# Patient Record
Sex: FEMALE | URBAN - METROPOLITAN AREA
[De-identification: names, ages, dates, MRNs, and addresses within clinical notes are randomized per-mention and may not be internally consistent; named-entity substitution may affect disease eponyms.]

---

## 2018-01-18 ENCOUNTER — OFFICE VISIT (OUTPATIENT)
Dept: URGENT CARE | Facility: URGENT CARE | Age: 43
End: 2018-01-18

## 2018-01-18 VITALS
WEIGHT: 140 LBS | DIASTOLIC BLOOD PRESSURE: 80 MMHG | HEIGHT: 57 IN | BODY MASS INDEX: 30.2 KG/M2 | HEART RATE: 80 BPM | TEMPERATURE: 98 F | SYSTOLIC BLOOD PRESSURE: 120 MMHG

## 2018-01-18 DIAGNOSIS — K08.89 PAIN, DENTAL: Primary | ICD-10-CM

## 2018-01-18 PROCEDURE — 99203 OFFICE O/P NEW LOW 30 MIN: CPT | Performed by: EMERGENCY MEDICINE

## 2018-01-18 RX ORDER — AMOXICILLIN 875 MG
875 TABLET ORAL 2 TIMES DAILY
Qty: 20 TABLET | Refills: 0 | Status: SHIPPED | OUTPATIENT
Start: 2018-01-18 | End: 2018-01-28

## 2018-01-18 ASSESSMENT — ENCOUNTER SYMPTOMS
DIAPHORESIS: 0
FEVER: 0

## 2018-01-18 NOTE — MR AVS SNAPSHOT
"              After Visit Summary   1/18/2018    Nati Santana    MRN: [id number]           Patient Information     Date Of Birth          1975        Visit Information        Provider Department      1/18/2018 6:10 PM Danielito Coelol MD [facility]        Today's Diagnoses     Pain, dental    -  1      Care Instructions       *DENTAL PAIN    A crack or cavity in the tooth, which exposes the sensitive inner area of the tooth can cause tooth pain. An infection in the gum or the root of the tooth can cause pain and swelling. The pain is often made worse by drinking hot or cold fluids, or biting on hard foods. Pain may spread from the tooth to the ear or jaw on the same side.  HOME CARE:  1. Avoid hot and cold foods and liquids since your tooth may be sensitive to temperature changes.  2. If your tooth is chipped or cracked, or if there is a large open cavity, apply OIL OF CLOVES (available over-the-counter in drug stores) directly to the tooth to reduce pain. Some pharmacies carry an over-the-counter \"toothache kit.\" This contains a paste, which can be applied over the exposed tooth to decrease sensitivity. This is only a temporary solution. See a dentist as soon as possible to fix the tooth.  3. A cold pack on your jaw over the sore area may help reduce pain.  4. You may use acetaminophen (Tylenol) 650-1000 mg every 6 hours or ibuprofen (Motrin, Advil) 600 mg every 6-8 hours with food to control pain, if you are able to take these medicines. [ NOTE: If you have chronic liver or kidney disease or ever had a stomach ulcer or GI bleeding, talk with your doctor before using these medicines.]  5. If you have signs of an infection, an antibiotic will be given. Take it as directed.  FOLLOW-UP as directed with a dentist. Your pain may go away with the treatment given. However, only a dentist can fully evaluate and treat the cause and prevent the pain from coming back again.  TOOTHACHE IS A " "SIGN OF DISEASE IN YOUR TOOTH AND SHOULD BE EXAMINED AND TREATED BY A DENTIST.  GET PROMPT MEDICAL ATTENTION if any of the following occur:    Your face becomes swollen or red    Pain worsens or spreads to the neck    Fever over 101  F (38.3  C)    Unusual drowsiness; headache or stiff neck; weakness or fainting    Pus drains from the tooth    Difficulty swallowing or breathing       0167-9458 [employer]. [address] [place] [address]. All rights reserved. This information is not intended as a substitute for professional medical care. Always follow your healthcare professional's instructions.  This information has been modified by your health care provider with permission from the publisher.            Follow-ups after your visit        Follow-up notes from your care team     Return if symptoms worsen or fail to improve.      Who to contact     If you have questions or need follow up information about today's clinic visit or your schedule please contact [facility] directly at [phone].  Normal or non-critical lab and imaging results will be communicated to you by Enevohart, letter or phone within 4 business days after the clinic has received the results. If you do not hear from us within 7 days, please contact the clinic through Enevohart or phone. If you have a critical or abnormal lab result, we will notify you by phone as soon as possible.  Submit refill requests through Stayzilla or call your pharmacy and they will forward the refill request to us. Please allow 3 business days for your refill to be completed.          Additional Information About Your Visit        Enevohart Information     Stayzilla lets you send messages to your doctor, view your test results, renew your prescriptions, schedule appointments and more. To sign up, go to [email or web address] . Click on \"Log in\" on the left side of the screen, which will take you to the Welcome page. Then click on " "\"Sign up Now\" on the right side of the page.     You will be asked to enter the access code listed below, as well as some personal information. Please follow the directions to create your username and password.     Your access code is: [email or web address]  Expires: 2018  6:29 PM     Your access code will  in 90 days. If you need help or a new code, please call your [facility] clinic or [phone].        Care EveryWhere ID     This is your Care EveryWhere ID. This could be used by other organizations to access your [facility] medical records  [id number]        Your Vitals Were     Pulse Temperature Height BMI (Body Mass Index)          80 98  F (36.7  C) 4' 9\" (1.448 m) 30.3 kg/m2         Blood Pressure from Last 3 Encounters:   18 120/80    Weight from Last 3 Encounters:   18 140 lb (63.5 kg)              Today, you had the following     No orders found for display         Today's Medication Changes          These changes are accurate as of: 18  6:31 PM.  If you have any questions, ask your nurse or doctor.               Start taking these medicines.        Dose/Directions    amoxicillin 875 MG tablet   Commonly known as:  AMOXIL   Used for:  Pain, dental        Dose:  875 mg   Take 1 tablet (875 mg) by mouth 2 times daily for 10 days   Quantity:  20 tablet   Refills:  0            Where to get your medicines      Some of these will need a paper prescription and others can be bought over the counter.  Ask your nurse if you have questions.     Bring a paper prescription for each of these medications     amoxicillin 875 MG tablet                Primary Care Provider    Provider Outside       No address on file        Equal Access to Services     [facility]: Hadii mickie Harkins, benjy sotelo, qaybta hernandez you. So [facility] [phone].    ATENCIÓN: Si habla español, tiene a dean disposición servicios gratuitos de asistencia lingüística. " Jennifer de la torre [phone].    We comply with applicable federal civil rights laws and [place] laws. We do not discriminate on the basis of race, color, national origin, age, disability, sex, sexual orientation, or gender identity.            Thank you!     Thank you for choosing [facility]  for your care. Our goal is always to provide you with excellent care. Hearing back from our patients is one way we can continue to improve our services. Please take a few minutes to complete the written survey that you may receive in the mail after your visit with us. Thank you!             Your Updated Medication List - Protect others around you: Learn how to safely use, store and throw away your medicines at www.disposemymeds.org.          This list is accurate as of: 1/18/18  6:31 PM.  Always use your most recent med list.                   Brand Name Dispense Instructions for use Diagnosis    amoxicillin 875 MG tablet    AMOXIL    20 tablet    Take 1 tablet (875 mg) by mouth 2 times daily for 10 days    Pain, dental

## 2018-01-19 NOTE — PROGRESS NOTES
"HPI   She comes to the  today for the first time complaining of lower frontal dental pain that began about one week ago and got worse today.  Has not noted any fevers, chills, n/v.  She reports that she just moved here from [place] and has no primary care.  She did contact a dentist today and they said they could get her in in one month.    No past medical history on file.    No past surgical history on file.    Social History     Social History     Marital status: Single     Spouse name: N/A     Number of children: N/A     Years of education: N/A     Occupational History     Not on file.     Social History Main Topics     Smoking status: Not on file     Smokeless tobacco: Not on file     Alcohol use Not on file     Drug use: Not on file     Sexual activity: Not on file     Other Topics Concern     Not on file     Social History Narrative       No family history on file.      Review of Systems   Constitutional: Negative for diaphoresis, fever and malaise/fatigue.   HENT:        Dental pain         /80  Pulse 80  Temp 98  F (36.7  C)  Ht 4' 9\" (1.448 m)  Wt 140 lb (63.5 kg)  BMI 30.3 kg/m2    Physical Exam   Constitutional: She is oriented to person, place, and time and well-developed, well-nourished, and in no distress. No distress.   HENT:   Head: Normocephalic and atraumatic.   Mouth/Throat: Uvula is midline. No oral lesions. No trismus in the jaw. Abnormal dentition. Dental caries present. No dental abscesses or uvula swelling. No oropharyngeal exudate, posterior oropharyngeal edema or posterior oropharyngeal erythema.   Pulmonary/Chest: Effort normal.   Neurological: She is alert and oriented to person, place, and time.   Skin: Skin is warm and dry. She is not diaphoretic. No erythema.   Psychiatric: Affect normal.   Nursing note and vitals reviewed.          Assessment/Plan:  Nati was seen today for urgent care and mouth problem.    Diagnoses and all orders for this visit:    Pain, dental  -     " amoxicillin (AMOXIL) 875 MG tablet; Take 1 tablet (875 mg) by mouth 2 times daily for 10 days      At this time it seems that the are of pain that she is complaining about is normal.  I do not see any inflamed gingiva and there is no swelling to suggest an abscess.  She does have poor dentition though.  I did give her abx to start and suggested that she f/u with a dentist within the next week.  She is a  insurance carrier so she should be able to get into a  dentist on a urgent basis sooner that the month she was quoted from another dentist.  I did not give her the opportunity to ask for pain medication.  She did not provide any photo id and she has no prior hx with [facility].  She states that she recently moved from [place] and a [place]  lookup of the name provided did not show any results in [place] or [place].  Though, I am not sure that is her name and or birth date since she did not provide any id.  She said she understood and agreed to plans.    Danielito Coello MD

## 2018-01-19 NOTE — PATIENT INSTRUCTIONS
"   *DENTAL PAIN    A crack or cavity in the tooth, which exposes the sensitive inner area of the tooth can cause tooth pain. An infection in the gum or the root of the tooth can cause pain and swelling. The pain is often made worse by drinking hot or cold fluids, or biting on hard foods. Pain may spread from the tooth to the ear or jaw on the same side.  HOME CARE:  1. Avoid hot and cold foods and liquids since your tooth may be sensitive to temperature changes.  2. If your tooth is chipped or cracked, or if there is a large open cavity, apply OIL OF CLOVES (available over-the-counter in drug stores) directly to the tooth to reduce pain. Some pharmacies carry an over-the-counter \"toothache kit.\" This contains a paste, which can be applied over the exposed tooth to decrease sensitivity. This is only a temporary solution. See a dentist as soon as possible to fix the tooth.  3. A cold pack on your jaw over the sore area may help reduce pain.  4. You may use acetaminophen (Tylenol) 650-1000 mg every 6 hours or ibuprofen (Motrin, Advil) 600 mg every 6-8 hours with food to control pain, if you are able to take these medicines. [ NOTE: If you have chronic liver or kidney disease or ever had a stomach ulcer or GI bleeding, talk with your doctor before using these medicines.]  5. If you have signs of an infection, an antibiotic will be given. Take it as directed.  FOLLOW-UP as directed with a dentist. Your pain may go away with the treatment given. However, only a dentist can fully evaluate and treat the cause and prevent the pain from coming back again.  TOOTHACHE IS A SIGN OF DISEASE IN YOUR TOOTH AND SHOULD BE EXAMINED AND TREATED BY A DENTIST.  GET PROMPT MEDICAL ATTENTION if any of the following occur:    Your face becomes swollen or red    Pain worsens or spreads to the neck    Fever over 101  F (38.3  C)    Unusual drowsiness; headache or stiff neck; weakness or fainting    Pus drains from the tooth    Difficulty " swallowing or breathing       8277-5196 [employer]. [address] [place] [address], [place] [address]. All rights reserved. This information is not intended as a substitute for professional medical care. Always follow your healthcare professional's instructions.  This information has been modified by your health care provider with permission from the publisher.